# Patient Record
Sex: FEMALE | Race: WHITE | NOT HISPANIC OR LATINO | Employment: OTHER | URBAN - METROPOLITAN AREA
[De-identification: names, ages, dates, MRNs, and addresses within clinical notes are randomized per-mention and may not be internally consistent; named-entity substitution may affect disease eponyms.]

---

## 2022-06-22 ENCOUNTER — OFFICE VISIT (OUTPATIENT)
Dept: OPHTHALMOLOGY | Facility: CLINIC | Age: 52
End: 2022-06-22
Payer: COMMERCIAL

## 2022-06-22 DIAGNOSIS — E07.9 THYROID EYE DISEASE: Primary | ICD-10-CM

## 2022-06-22 DIAGNOSIS — E05.00 PROPTOSIS DUE TO THYROID DISORDER: ICD-10-CM

## 2022-06-22 DIAGNOSIS — H57.89 THYROID EYE DISEASE: Primary | ICD-10-CM

## 2022-06-22 DIAGNOSIS — H02.539 EYELID RETRACTION OR LAG: ICD-10-CM

## 2022-06-22 PROCEDURE — 99204 OFFICE O/P NEW MOD 45 MIN: CPT | Performed by: OPHTHALMOLOGY

## 2022-06-22 RX ORDER — METHIMAZOLE 5 MG/1
TABLET ORAL
COMMUNITY
Start: 2021-12-02

## 2022-06-22 RX ORDER — MULTIVITAMIN
TABLET ORAL
COMMUNITY

## 2022-06-22 ASSESSMENT — REFRACTION_WEARINGRX
OD_AXIS: 157
OS_SPHERE: -1.00
OS_AXIS: 005
SPECS_TYPE: PAL
OS_CYLINDER: +1.25
OD_CYLINDER: +1.50
OD_SPHERE: -1.25

## 2022-06-22 ASSESSMENT — VISUAL ACUITY
OD_CC: 20/20
OS_PH_CC: 20/20
METHOD: SNELLEN - LINEAR
CORRECTION_TYPE: GLASSES
OS_CC: 20/25

## 2022-06-22 ASSESSMENT — MARGIN REFLEX DISTANCE
OD_MRD1: 4
OD_MRD2: 5
OS_MRD1: 7
OS_MRD2: 5

## 2022-06-22 ASSESSMENT — TONOMETRY
OD_IOP_MMHG: 13
IOP_METHOD: TONOPEN
OS_IOP_MMHG: 15

## 2022-06-22 ASSESSMENT — CUP TO DISC RATIO
OD_RATIO: 0.2
OS_RATIO: 0.2

## 2022-06-22 ASSESSMENT — CONF VISUAL FIELD
METHOD: COUNTING FINGERS
OD_NORMAL: 1
OS_NORMAL: 1

## 2022-06-22 NOTE — PROGRESS NOTES
"     Chief Complaint(s) and History of Present Illness(es)     Referral     Comments:   Dr. Nicholas Brown referred patient for evaluation of left eye.              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  Patient says left eye is more open than right, increases later in the day.  Started 2021.  Denies tearing, redness, dryness each eye today.  Says a little double vision while reading or laying wrong way when   watching TV.  BLAINE was last year in [place], [place], says normal eye exam.  History of : LASIK each eye ~, dry eyes  Eye meds: systane gel each eye at bedtime, Blink tears each eye   FIDELINA Aguila 2022 8:49 AM    Thyroid: 3/2021 went to her OB GYN - because she was feeling hot all of the time and labs were obtained.  Found to be hyperthyroid, and treated with methimazole. She is currently on 5 mg.      Eyes: 2021 she first noticed her left upper eyelid started to \"pull.\" She notes the right upper eyelid has started to drop.  She will very rarely get diplopia - only notices if she lays on her side and watches television, but has not occurred recently.     Labs:  2022:   TSI (<1.3)  TSH: 0.579  T3: 106  T4: 1.10  A1c : 5.9    Non smoker    History of laser in situ keratomileusis (LASIK)    FINN: 0    Note: On phentermine      Assessment & Plan     Stacey M Schoenke is a 51 year old female with the following diagnoses:   Encounter Diagnoses   Name Primary?     Thyroid eye disease Yes     Proptosis due to thyroid disorder      Eyelid retraction or lag      Six months of symptoms but FINN is zero. Mild to moderate Thyroid eye disease with lid retraction and proptosis left.   Discussed options including steroids, Tepezza, and observation.    After discussing options, she elected to proceed with observation.   Recommend selenium 100mcg twice a day. Discussed ANDREWS can worsen Thyroid eye disease, and would treat with steroids, or consider " thyroidectomy instead which can improve symptoms.    F/u 4 months. Also discussed would see her in Thyroid eye disease clinic if develops diplopia.       Patient disposition:   No follow-ups on file.        Attending Physician Attestation: Complete documentation of historical and exam elements from today's encounter can be found in the full encounter summary report (not reduplicated in this progress note). I personally obtained the chief complaint(s) and history of present illness. I confirmed and edited as necessary the review of systems, past medical/surgical history, family history, social history, and examination findings as documented by others; and I examined the patient myself. I personally reviewed the relevant tests, images, and reports as documented above. I formulated and edited as necessary the assessment and plan and discussed the findings and management plan with the patient.  -Brian Castelan MD    40 minutes spent reviewing records, labs, evaluating patient and counseling.

## 2022-06-22 NOTE — NURSING NOTE
Chief Complaints and History of Present Illnesses   Patient presents with     Referral       Dr. Nicholas Brown referred patient for evaluation of left eye.      Chief Complaint(s) and History of Present Illness(es)     Referral     Comments:   Dr. Nicholas Brown referred patient for evaluation of left eye.              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  Patient says left eye is more open than right, increases later in the day.  Started December 27, 2021.  Denies tearing, redness, dryness each eye today.  Says a little double vision while reading or laying wrong way when watching TV.  BLAINE was last year in [place], [place], says normal eye exam.  History of : LASIK each eye ~2007, dry eyes  Eye meds: systane gel each eye at bedtime, Blink tears each eye   FIDELINA Aguila 6/22/2022 8:49 AM

## 2022-10-15 ENCOUNTER — HEALTH MAINTENANCE LETTER (OUTPATIENT)
Age: 52
End: 2022-10-15

## 2022-11-02 ENCOUNTER — OFFICE VISIT (OUTPATIENT)
Dept: OPHTHALMOLOGY | Facility: CLINIC | Age: 52
End: 2022-11-02
Payer: COMMERCIAL

## 2022-11-02 DIAGNOSIS — E07.9 THYROID EYE DISEASE: Primary | ICD-10-CM

## 2022-11-02 DIAGNOSIS — H02.539 EYELID RETRACTION OR LAG: ICD-10-CM

## 2022-11-02 DIAGNOSIS — E05.00 PROPTOSIS DUE TO THYROID DISORDER: ICD-10-CM

## 2022-11-02 DIAGNOSIS — H57.89 THYROID EYE DISEASE: Primary | ICD-10-CM

## 2022-11-02 PROCEDURE — 99213 OFFICE O/P EST LOW 20 MIN: CPT | Performed by: OPHTHALMOLOGY

## 2022-11-02 RX ORDER — METFORMIN HYDROCHLORIDE 750 MG/1
TABLET, EXTENDED RELEASE ORAL
COMMUNITY
Start: 2022-07-18

## 2022-11-02 ASSESSMENT — VISUAL ACUITY
CORRECTION_TYPE: GLASSES
OS_CC: 20/40
OS_CC+: +2
OD_CC+: -2
OD_CC: 20/20
METHOD: SNELLEN - LINEAR

## 2022-11-02 ASSESSMENT — CONF VISUAL FIELD
METHOD: COUNTING FINGERS
OD_SUPERIOR_TEMPORAL_RESTRICTION: 0
OS_NORMAL: 1
OS_SUPERIOR_NASAL_RESTRICTION: 0
OS_SUPERIOR_TEMPORAL_RESTRICTION: 0
OS_INFERIOR_TEMPORAL_RESTRICTION: 0
OD_INFERIOR_TEMPORAL_RESTRICTION: 0
OD_INFERIOR_NASAL_RESTRICTION: 0
OD_SUPERIOR_NASAL_RESTRICTION: 0
OD_NORMAL: 1
OS_INFERIOR_NASAL_RESTRICTION: 0

## 2022-11-02 ASSESSMENT — TONOMETRY
IOP_METHOD: ICARE
OD_IOP_MMHG: 10
OS_IOP_MMHG: 10

## 2022-11-02 ASSESSMENT — MARGIN REFLEX DISTANCE
OS_MRD2: 5
OD_MRD1: 4
OS_MRD1: 7
OD_MRD2: 5

## 2022-11-02 ASSESSMENT — CUP TO DISC RATIO
OD_RATIO: 0.2
OS_RATIO: 0.2

## 2022-11-02 NOTE — PROGRESS NOTES
"     Chief Complaint(s) and History of Present Illness(es)     Follow Up            Laterality: left eye    Comments: Graves follow up          Comments    Patient returning for 4 month ZENAIDA follow up. Patient states that her left   eye has been watering, worse in the mornings. Has ringing in her ear. She   wears her sunglasses a lot to try to cover her eye. Has not been wearing   any eye make-up. Has her son's wedding in two weeks (Nov 19th) and is   concerned about her left eye looking more open in photos. Getting   \"frustrated with the whole thing and ready for it to be over\"    Not painful, but \"annoying\".  Uses Systane Ultra all the time.   Little bit of double vision, but clears up quickly.       Thyroid: 3/2021 went to OB gyn, feeling hot all of the time and labs were obtained. Found to have multinodular goiter. Down to 2.5 mg of methimazole daily. Considering thyroidectomy.  Endocrinologist: Dr. Marilou Moraes     Eyes: 12/26 started noticing left upper eyelid pulling. Occasional ache. Feels a bit worse with watering now on the left side. Occasional diplopia lasts very brief moments. Feels right eye is overall normal.     Prior TSI: 2 4/5/2022 (<1.3)   TSH was 1.58 10/22/2022  Assessment & Plan     Stacey M Schoenke is a 51 year old female with the following diagnoses:   Encounter Diagnoses   Name Primary?     Thyroid eye disease Yes     Proptosis due to thyroid disorder      Eyelid retraction or lag      Overall exam is stable. It has been almost one year since her onset of ocular symptoms.   Will observe but she is likely entering the chronic phase of the disease. If no changes in measurements in 3-4 months consider proceeding with left orbital decompression, then likely eyelid retraction repair and finally possibly bilateral lower fat decompression.    She is considering thyroidectomy.         Attending Physician Attestation: Complete documentation of historical and exam elements from today's encounter can be " found in the full encounter summary report (not reduplicated in this progress note). I personally obtained the chief complaint(s) and history of present illness. I confirmed and edited as necessary the review of systems, past medical/surgical history, family history, social history, and examination findings as documented by others; and I examined the patient myself. I personally reviewed the relevant tests, images, and reports as documented above. I formulated and edited as necessary the assessment and plan and discussed the findings and management plan with the patient.  -Brian Castelan MD

## 2022-11-02 NOTE — NURSING NOTE
"Chief Complaints and History of Present Illnesses   Patient presents with     Follow Up     Graves follow up       Chief Complaint(s) and History of Present Illness(es)     Follow Up            Laterality: left eye    Comments: Graves follow up          Comments    Patient returning for 4 month ZENAIDA follow up. Patient states that her left eye has been watering, worse in the mornings. Has ringing in her ear. She wears her sunglasses a lot to try to cover her eye. Has not been wearing any eye make-up. Has her son's wedding in two weeks (Nov 19th) and is concerned about her left eye looking more open in photos. Getting \"frustrated with the whole thing and ready for it to be over\"    Not painful, but \"annoying\".  Uses Systane Ultra all the time.   Little bit of double vision, but clears up quickly.                    Santana Pearce, Ophthalmic Assistant   "

## 2023-02-15 ENCOUNTER — OFFICE VISIT (OUTPATIENT)
Dept: OPHTHALMOLOGY | Facility: CLINIC | Age: 53
End: 2023-02-15
Payer: COMMERCIAL

## 2023-02-15 ENCOUNTER — TELEPHONE (OUTPATIENT)
Dept: NURSING | Facility: CLINIC | Age: 53
End: 2023-02-15

## 2023-02-15 DIAGNOSIS — H57.89 THYROID EYE DISEASE: Primary | ICD-10-CM

## 2023-02-15 DIAGNOSIS — E05.00 PROPTOSIS DUE TO THYROID DISORDER: ICD-10-CM

## 2023-02-15 DIAGNOSIS — E07.9 THYROID EYE DISEASE: Primary | ICD-10-CM

## 2023-02-15 DIAGNOSIS — H02.539 EYELID RETRACTION OR LAG: ICD-10-CM

## 2023-02-15 PROCEDURE — 99214 OFFICE O/P EST MOD 30 MIN: CPT | Performed by: OPHTHALMOLOGY

## 2023-02-15 ASSESSMENT — CONF VISUAL FIELD
OS_NORMAL: 1
OD_SUPERIOR_TEMPORAL_RESTRICTION: 0
OD_NORMAL: 1
OD_INFERIOR_NASAL_RESTRICTION: 0
OS_SUPERIOR_NASAL_RESTRICTION: 0
OD_INFERIOR_TEMPORAL_RESTRICTION: 0
OS_SUPERIOR_TEMPORAL_RESTRICTION: 0
OS_INFERIOR_TEMPORAL_RESTRICTION: 0
OS_INFERIOR_NASAL_RESTRICTION: 0
OD_SUPERIOR_NASAL_RESTRICTION: 0

## 2023-02-15 ASSESSMENT — VISUAL ACUITY
OS_CC: 20/50
METHOD: SNELLEN - LINEAR
CORRECTION_TYPE: GLASSES
OD_CC: 20/20
OS_CC+: +2

## 2023-02-15 ASSESSMENT — REFRACTION_WEARINGRX
OS_AXIS: 005
SPECS_TYPE: PAL
OS_SPHERE: -1.00
OS_CYLINDER: +1.25
OD_AXIS: 157
OD_CYLINDER: +1.50
OD_SPHERE: -1.25

## 2023-02-15 ASSESSMENT — TONOMETRY
IOP_METHOD: ICARE
OD_IOP_MMHG: 11
OS_IOP_MMHG: 10

## 2023-02-15 ASSESSMENT — CUP TO DISC RATIO
OS_RATIO: 0.2
OD_RATIO: 0.2

## 2023-02-15 NOTE — PROGRESS NOTES
"     Chief Complaint(s) and History of Present Illness(es)     Follow Up            Comments: Thyroid eye disease           Comments    Pt states the left eye is about the same as last visit   C/o tearing left eye in the mornings  States \"Not painful, but \"annoying\".  No AM crusting or redness    Gertrude Duong COT 10:18 AM February 15, 2023     Thyroid: 3/2021 went to OB gyn, feeling hot all of the time and labs were obtained. Found to have multinodular goiter. Down to 2.5 mg of methimazole daily. Considering thyroidectomy.  Endocrinologist: Dr. Marilou Moraes      Eyes: 12/26/21 started noticing left upper eyelid pulling. Occasional ache. Feels a bit worse with watering now on the left side. Occasional diplopia lasts very brief moments. Feels right eye is overall normal.     A1c 6.1    Assessment & Plan     Stacey M Schoenke is a 52 year old female with the following diagnoses:   Encounter Diagnoses   Name Primary?     Thyroid eye disease Yes     Proptosis due to thyroid disorder      Eyelid retraction or lag      She is in the stable phase of her thryoid eye disease, with stable measurements since June of last year.    She has significant dry eye.    Discussed options including bone and fat decompression on the left, lid retraction and lower lid fat decompression.    At this point will hold off on bone and fat decompression on the left.    Start with left upper eyelid retraction repair - mullerectomy [id number]    Afterwards can consider lower fat decompression.   Patient disposition:   No follow-ups on file.        Attending Physician Attestation: Complete documentation of historical and exam elements from today's encounter can be found in the full encounter summary report (not reduplicated in this progress note). I personally obtained the chief complaint(s) and history of present illness. I confirmed and edited as necessary the review of systems, past medical/surgical history, family history, social history, and " examination findings as documented by others; and I examined the patient myself. I personally reviewed the relevant tests, images, and reports as documented above. I formulated and edited as necessary the assessment and plan and discussed the findings and management plan with the patient.  -Brian Castelan MD    Today with Stacey M Schoenke  and her  Adriano, I reviewed the indications, risks, benefits, and alternatives of the proposed surgical procedure including, but not limited to, failure obtain the desired result  and need for additional surgery, bleeding, infection, loss of vision, loss of the eye, and the remote possibility of permanent damage to any organ system or death with the use of anesthesia.  I provided multiple opportunities for the questions, answered all questions to the best of my ability, and confirmed that my answers and my discussion were understood.   Brian Castelan MD

## 2023-02-15 NOTE — TELEPHONE ENCOUNTER
Scheduled surgery for 4/17 in [place].    H&P with PCP.    Post-op scheduled for 5/10.    Packet given in clinic.    No further questions/concerns at this time.

## 2023-02-15 NOTE — NURSING NOTE
"Chief Complaints and History of Present Illnesses   Patient presents with     Follow Up     Thyroid eye disease      Chief Complaint(s) and History of Present Illness(es)     Follow Up            Comments: Thyroid eye disease           Comments    Pt states the left eye is about the same as last visit   C/o tearing left eye in the mornings  States \"Not painful, but \"annoying\".  No AM crusting or redness    Gertrude Duong COT 10:18 AM February 15, 2023                      "

## 2023-04-04 ENCOUNTER — TELEPHONE (OUTPATIENT)
Dept: OPHTHALMOLOGY | Facility: CLINIC | Age: 53
End: 2023-04-04
Payer: COMMERCIAL

## 2023-04-07 ENCOUNTER — TELEPHONE (OUTPATIENT)
Dept: OPHTHALMOLOGY | Facility: CLINIC | Age: 53
End: 2023-04-07
Payer: COMMERCIAL

## 2023-04-07 NOTE — TELEPHONE ENCOUNTER
Writer reached out to the insurance and spoke with Юлия. Юлия states a peer to peer is the only option for this case. Call reference # [id number].    I can confirm that the office notes dated 2/15/2023, 11/2/2022 and 6/22/2022 were all sent in for a reconsideration on 4/4/2023 as our notes indicate this. The case was still denied. Please advise

## 2023-04-07 NOTE — TELEPHONE ENCOUNTER
Records were sent again to the fax # [phone] requesting the records be placed to the case to reference for the upcoming peer to peer scheduled 4/12/23.

## 2023-04-07 NOTE — TELEPHONE ENCOUNTER
Good Morning,    This is a peer to peer request for Left upper eyelid retraction repair procedure. The insurance is requesting this to better understand the reason why the test is being ordered. This peer to peer needs to be completed on or before 4/12/2023.     Please call the insurance company and reference the following information:    Payor phone number: [phone]    Case number: [id number]    Patient ID: [id number]    Reason why it was denied: not medically necessary           Requesting response of outcome back to FC's on approval/denial with the following information:   o auth#  o date range  o who they spoke to     If you have any further questions please feel free to reach out to me. Thank you for your attention on this.       Willa Funk    Financial Counselor  [address] [address] [place] [id number]  Phone- [phone]  Fax- [phone]

## 2023-04-14 ENCOUNTER — ANESTHESIA EVENT (OUTPATIENT)
Dept: SURGERY | Facility: AMBULATORY SURGERY CENTER | Age: 53
End: 2023-04-14
Payer: COMMERCIAL

## 2023-04-17 ENCOUNTER — ANESTHESIA (OUTPATIENT)
Dept: SURGERY | Facility: AMBULATORY SURGERY CENTER | Age: 53
End: 2023-04-17
Payer: COMMERCIAL

## 2023-04-17 ENCOUNTER — HOSPITAL ENCOUNTER (OUTPATIENT)
Facility: AMBULATORY SURGERY CENTER | Age: 53
Discharge: HOME | End: 2023-04-17
Attending: OPHTHALMOLOGY | Admitting: OPHTHALMOLOGY
Payer: COMMERCIAL

## 2023-04-17 VITALS
BODY MASS INDEX: 35.17 KG/M2 | SYSTOLIC BLOOD PRESSURE: 122 MMHG | TEMPERATURE: 98 F | RESPIRATION RATE: 16 BRPM | HEIGHT: 64 IN | OXYGEN SATURATION: 96 % | WEIGHT: 206 LBS | DIASTOLIC BLOOD PRESSURE: 69 MMHG

## 2023-04-17 DIAGNOSIS — E07.9 THYROID EYE DISEASE: ICD-10-CM

## 2023-04-17 DIAGNOSIS — H02.539 EYELID RETRACTION OR LAG: ICD-10-CM

## 2023-04-17 DIAGNOSIS — H57.89 THYROID EYE DISEASE: ICD-10-CM

## 2023-04-17 DIAGNOSIS — E05.00 PROPTOSIS DUE TO THYROID DISORDER: ICD-10-CM

## 2023-04-17 PROCEDURE — G8918 PT W/O PREOP ORDER IV AB PRO: HCPCS

## 2023-04-17 PROCEDURE — 67911 REVISE EYELID DEFECT: CPT | Mod: E1

## 2023-04-17 PROCEDURE — 67911 REVISE EYELID DEFECT: CPT | Mod: E1 | Performed by: OPHTHALMOLOGY

## 2023-04-17 PROCEDURE — G8907 PT DOC NO EVENTS ON DISCHARG: HCPCS

## 2023-04-17 RX ORDER — TETRACAINE HYDROCHLORIDE 5 MG/ML
SOLUTION OPHTHALMIC PRN
Status: DISCONTINUED | OUTPATIENT
Start: 2023-04-17 | End: 2023-04-17 | Stop reason: HOSPADM

## 2023-04-17 RX ORDER — HYDROXYZINE HYDROCHLORIDE 25 MG/1
25 TABLET, FILM COATED ORAL EVERY 6 HOURS PRN
Status: DISCONTINUED | OUTPATIENT
Start: 2023-04-17 | End: 2023-04-18 | Stop reason: HOSPADM

## 2023-04-17 RX ORDER — HALOPERIDOL 5 MG/ML
1 INJECTION INTRAMUSCULAR
Status: DISCONTINUED | OUTPATIENT
Start: 2023-04-17 | End: 2023-04-18 | Stop reason: HOSPADM

## 2023-04-17 RX ORDER — OXYCODONE HYDROCHLORIDE 5 MG/1
5 TABLET ORAL
Status: DISCONTINUED | OUTPATIENT
Start: 2023-04-17 | End: 2023-04-18 | Stop reason: HOSPADM

## 2023-04-17 RX ORDER — LABETALOL HYDROCHLORIDE 5 MG/ML
10 INJECTION, SOLUTION INTRAVENOUS
Status: DISCONTINUED | OUTPATIENT
Start: 2023-04-17 | End: 2023-04-18 | Stop reason: HOSPADM

## 2023-04-17 RX ORDER — LIDOCAINE 40 MG/G
CREAM TOPICAL
Status: DISCONTINUED | OUTPATIENT
Start: 2023-04-17 | End: 2023-04-18 | Stop reason: HOSPADM

## 2023-04-17 RX ORDER — FENTANYL CITRATE 50 UG/ML
25 INJECTION, SOLUTION INTRAMUSCULAR; INTRAVENOUS
Status: DISCONTINUED | OUTPATIENT
Start: 2023-04-17 | End: 2023-04-18 | Stop reason: HOSPADM

## 2023-04-17 RX ORDER — DEXAMETHASONE SODIUM PHOSPHATE 4 MG/ML
4 INJECTION, SOLUTION INTRA-ARTICULAR; INTRALESIONAL; INTRAMUSCULAR; INTRAVENOUS; SOFT TISSUE
Status: DISCONTINUED | OUTPATIENT
Start: 2023-04-17 | End: 2023-04-18 | Stop reason: HOSPADM

## 2023-04-17 RX ORDER — KETOROLAC TROMETHAMINE 30 MG/ML
15 INJECTION, SOLUTION INTRAMUSCULAR; INTRAVENOUS
Status: DISCONTINUED | OUTPATIENT
Start: 2023-04-17 | End: 2023-04-18 | Stop reason: HOSPADM

## 2023-04-17 RX ORDER — FENTANYL CITRATE 50 UG/ML
25 INJECTION, SOLUTION INTRAMUSCULAR; INTRAVENOUS EVERY 5 MIN PRN
Status: DISCONTINUED | OUTPATIENT
Start: 2023-04-17 | End: 2023-04-18 | Stop reason: HOSPADM

## 2023-04-17 RX ORDER — ACETAMINOPHEN 325 MG/1
975 TABLET ORAL ONCE
Status: DISCONTINUED | OUTPATIENT
Start: 2023-04-17 | End: 2023-04-18 | Stop reason: HOSPADM

## 2023-04-17 RX ORDER — ONDANSETRON 4 MG/1
4 TABLET, ORALLY DISINTEGRATING ORAL EVERY 30 MIN PRN
Status: DISCONTINUED | OUTPATIENT
Start: 2023-04-17 | End: 2023-04-18 | Stop reason: HOSPADM

## 2023-04-17 RX ORDER — SODIUM CHLORIDE, SODIUM LACTATE, POTASSIUM CHLORIDE, CALCIUM CHLORIDE 600; 310; 30; 20 MG/100ML; MG/100ML; MG/100ML; MG/100ML
INJECTION, SOLUTION INTRAVENOUS CONTINUOUS
Status: DISCONTINUED | OUTPATIENT
Start: 2023-04-17 | End: 2023-04-18 | Stop reason: HOSPADM

## 2023-04-17 RX ORDER — OXYCODONE HYDROCHLORIDE 5 MG/1
10 TABLET ORAL
Status: DISCONTINUED | OUTPATIENT
Start: 2023-04-17 | End: 2023-04-18 | Stop reason: HOSPADM

## 2023-04-17 RX ORDER — ACETAMINOPHEN 325 MG/1
975 TABLET ORAL ONCE
Status: COMPLETED | OUTPATIENT
Start: 2023-04-17 | End: 2023-04-17

## 2023-04-17 RX ORDER — NEOMYCIN SULFATE, POLYMYXIN B SULFATE AND DEXAMETHASONE 3.5; 10000; 1 MG/ML; [USP'U]/ML; MG/ML
SUSPENSION/ DROPS OPHTHALMIC
Qty: 5 ML | Refills: 0 | Status: SHIPPED | OUTPATIENT
Start: 2023-04-17

## 2023-04-17 RX ORDER — LIDOCAINE HYDROCHLORIDE 20 MG/ML
INJECTION, SOLUTION INFILTRATION; PERINEURAL PRN
Status: DISCONTINUED | OUTPATIENT
Start: 2023-04-17 | End: 2023-04-17

## 2023-04-17 RX ORDER — DIMENHYDRINATE 50 MG/ML
25 INJECTION, SOLUTION INTRAMUSCULAR; INTRAVENOUS
Status: DISCONTINUED | OUTPATIENT
Start: 2023-04-17 | End: 2023-04-18 | Stop reason: HOSPADM

## 2023-04-17 RX ORDER — ALBUTEROL SULFATE 0.83 MG/ML
2.5 SOLUTION RESPIRATORY (INHALATION) EVERY 4 HOURS PRN
Status: DISCONTINUED | OUTPATIENT
Start: 2023-04-17 | End: 2023-04-18 | Stop reason: HOSPADM

## 2023-04-17 RX ORDER — FENTANYL CITRATE 50 UG/ML
50 INJECTION, SOLUTION INTRAMUSCULAR; INTRAVENOUS EVERY 5 MIN PRN
Status: DISCONTINUED | OUTPATIENT
Start: 2023-04-17 | End: 2023-04-18 | Stop reason: HOSPADM

## 2023-04-17 RX ORDER — ONDANSETRON 2 MG/ML
4 INJECTION INTRAMUSCULAR; INTRAVENOUS EVERY 30 MIN PRN
Status: DISCONTINUED | OUTPATIENT
Start: 2023-04-17 | End: 2023-04-18 | Stop reason: HOSPADM

## 2023-04-17 RX ORDER — HYDRALAZINE HYDROCHLORIDE 20 MG/ML
2.5-5 INJECTION INTRAMUSCULAR; INTRAVENOUS EVERY 10 MIN PRN
Status: DISCONTINUED | OUTPATIENT
Start: 2023-04-17 | End: 2023-04-18 | Stop reason: HOSPADM

## 2023-04-17 RX ORDER — LORAZEPAM 2 MG/ML
.5-1 INJECTION INTRAMUSCULAR
Status: DISCONTINUED | OUTPATIENT
Start: 2023-04-17 | End: 2023-04-18 | Stop reason: HOSPADM

## 2023-04-17 RX ORDER — PROPOFOL 10 MG/ML
INJECTION, EMULSION INTRAVENOUS PRN
Status: DISCONTINUED | OUTPATIENT
Start: 2023-04-17 | End: 2023-04-17

## 2023-04-17 RX ORDER — ERYTHROMYCIN 5 MG/G
OINTMENT OPHTHALMIC PRN
Status: DISCONTINUED | OUTPATIENT
Start: 2023-04-17 | End: 2023-04-17 | Stop reason: HOSPADM

## 2023-04-17 RX ORDER — MEPERIDINE HYDROCHLORIDE 25 MG/ML
12.5 INJECTION INTRAMUSCULAR; INTRAVENOUS; SUBCUTANEOUS EVERY 5 MIN PRN
Status: DISCONTINUED | OUTPATIENT
Start: 2023-04-17 | End: 2023-04-18 | Stop reason: HOSPADM

## 2023-04-17 RX ADMIN — ACETAMINOPHEN 975 MG: 325 TABLET ORAL at 10:23

## 2023-04-17 RX ADMIN — LIDOCAINE HYDROCHLORIDE 60 MG: 20 INJECTION, SOLUTION INFILTRATION; PERINEURAL at 11:01

## 2023-04-17 RX ADMIN — PROPOFOL 70 MG: 10 INJECTION, EMULSION INTRAVENOUS at 11:02

## 2023-04-17 RX ADMIN — SODIUM CHLORIDE, SODIUM LACTATE, POTASSIUM CHLORIDE, CALCIUM CHLORIDE: 600; 310; 30; 20 INJECTION, SOLUTION INTRAVENOUS at 10:59

## 2023-04-17 NOTE — DISCHARGE INSTRUCTIONS
Post-operative Instructions  Ophthalmic Plastic and Reconstructive Surgery    Brian Castelan M.D.     All instructions apply to the operated eye(s) or eyelid(s).    Wound care and personal care  ? Apply ice compresses 15 minutes of every hour while awake for 2 days. If you are sleeping, you don't need to wake up to ice. As long as there is no further bleeding, after two days, switch to warm water compresses for five minutes, four times a day until seen by your physician.   ? You may shower or wash your hair the day after surgery. Do not go swimming for at least 2 weeks to prevent contamination of your wounds.  ? You may go for walks and light activity is ok, but no heavy (over 15 pounds) lifting, bending or excessive straining for one week.   ? Do not apply make-up to the eyes or eyelids for 2 weeks after surgery.  ? Expect some swelling, bruising, black eye (even into the lower eyelids and cheeks). Also expect serum caking, crusting and discharge from the eye and/or incisions. A small amount of surface bleeding, and depending on the type of surgery, bleeding from the inside of the eyelid, is normal for the first 48 hours.  ? Avoid straining, bending at the waist, or lifting more than 15 pounds for 1 week. Sleeping with your head elevated, such as in a recliner, for the first several days can help swelling resolve more quickly.   ? Do continue to ambulate (walk) as you normally would - being sedentary after surgery can cause blood clots.   ? Your eye(s) and eyelid(s) may be painful and tender. This is normal after surgery.      Contact information and follow-up  ? Return to the Eye Clinic for a follow-up appointment with your physician as scheduled. If no appointment has been scheduled:     -  [facility] eye clinic: [phone] for an appointment with Dr. Castelan within 2 to 3 weeks from your date of surgery.     ? For severe pain, bleeding, or loss of vision, call the [facility] at [phone] or [facility] at [phone].     After hours or on weekends and holidays, call [phone] and ask to speak with the ophthalmologist on call.    An on call person can be reached after hours for concerns. The on call doctor should not call in medication refill requests after hours or on weekends, so please plan accordingly. An effort has been made to provide adequate pain medications following every surgery, and refills will not be provided in most instances.     Medications  ? Restart all regular home medications and eye drops. If you take Plavix or Aspirin on a regular basis, wait for 72 hours after your surgery before restarting these in order to decrease the risk of bleeding complications.  ? Avoid aspirin and aspirin-like medications (Motrin, Aleve, Ibuprofen, [place] etc) for 72 hours to reduce the risk of bleeding. You may take Tylenol (acetaminophen) for pain.  ? In addition to your home medications, take the following post-operative medications as prescribed by your physician.    ? Instill prescribed eye drops 3 times a day for 10 days.   ? If you have ocular irritation, you can use over the counter artificial tears such as Refresh, Systane, or Blink. Do not use Visine, Clear Eyes, or any other drop that gets the red out.       While you were at the hospital today you received 975 mg Tylenol at 10:30 am. Maximum daily dosage is 4000 mg.    [facility]   Adult Discharge Orders & Instructions     For 24 hours after surgery    Get plenty of rest.  A responsible adult must stay with you for at least 24 hours after you leave the hospital.   Do not drive or use heavy equipment.  If you have weakness or tingling, don't drive or use heavy equipment until this feeling goes away.  Do not drink alcohol.  Avoid strenuous or risky activities.  Ask for help when climbing stairs.   You may feel lightheaded.  IF so, sit for a few minutes before standing.  Have  someone help you get up.   If you have nausea (feel sick to your stomach): Drink only clear liquids such as apple juice, ginger ale, broth or 7-Up.  Rest may also help.  Be sure to drink enough fluids.  Move to a regular diet as you feel able.  You may have a slight fever. Call the doctor if your fever is over 100 F (37.7 C) (taken under the tongue) or lasts longer than 24 hours.  You may have a dry mouth, a sore throat, muscle aches or trouble sleeping.  These should go away after 24 hours.  Do not make important or legal decisions.     Call your doctor for any of the followin.  Signs of infection (fever, growing tenderness at the surgery site, a large amount of drainage or bleeding, severe pain, foul-smelling drainage, redness, swelling).    2. It has been over 8 to 10 hours since surgery and you are still not able to urinate (pass water).    3.  Headache for over 24 hours.    4.  Numbness, tingling or weakness the day after surgery (if you had spinal anesthesia).                  5. Signs of Covid-19 infection (temperature over 100 degrees, shortness of breath, cough, loss of taste/smell, generalized body aches, persistent headache,                  chills, sore throat, nausea/vomiting/diarrhea).

## 2023-04-17 NOTE — OP NOTE
PREOPERATIVE DIAGNOSIS: Left upper eyelid retraction. Thyroid eye disease.   POSTOPERATIVE DIAGNOSIS: Left upper eyelid retraction. Thyroid eye disease.   PROCEDURE PERFORMED:Left  upper eyelid retraction repair.   SURGEON: Brian Castelan MD   ANESTHESIA: Monitored with local infiltration of a 50/50 mixture of 2% lidocaine with epinephrine and 0.5% Marcaine.   COMPLICATIONS: None.   ESTIMATED BLOOD LOSS: Less than 5 mL.   HISTORY: Stacey M Schoenke  presented with lid retraction of the left upper lid, leading to exposure keratitis. After the risks, benefits and alternatives to the proposed procedure were explained, informed consent was obtained.   DESCRIPTION OF PROCEDURE: Stacey M Schoenke  was brought to the operating room and placed supine on the operating table. IV sedation was given. The left upper eyelid was infiltrated with local anesthetic. The area  was prepped and draped in the typical sterile ophthalmic fashion. Attention was directed to the left  side. 4-0 silk suture was placed through the eyelid margin and the eyelid everted over a Desmarres retractor. The conjunctiva was hydrodissected from the underlying Jurado's muscle with the local anesthetic mixture. A #15 blade was used to incise the conjunctiva at the superior tarsal border. Using a straight iris scissors, the conjunctiva was gently dissected from the underlying Jurado's muscle. Conjunctiva was then released from the superior tarsal border. The Jurado's muscle was then hydrodissected from the underlying levator aponeurosis. The Buck scissor was used to dissect the Jurado's muscle from the superior tarsal border using the high temperature cautery. Jurado's muscle was resected. Hemostasis was obtained, the lid reverted to its normal position. The silk suture was removed. Antibiotic ointment applied to the eye. The patient tolerated the procedure well and  left the operating room in stable condition.   Brian Castelan MD

## 2023-04-17 NOTE — ANESTHESIA POSTPROCEDURE EVALUATION
Patient: Stacey M Schoenke    Procedure: Procedure(s):  Left upper eyelid retraction repair       Anesthesia Type:  MAC    Note:  Disposition: Outpatient   Postop Pain Control: Uneventful            Sign Out: Well controlled pain   PONV: No   Neuro/Psych: Uneventful            Sign Out: Acceptable/Baseline neuro status   Airway/Respiratory: Uneventful            Sign Out: Acceptable/Baseline resp. status   CV/Hemodynamics: Uneventful            Sign Out: Acceptable CV status   Other NRE: NONE   DID A NON-ROUTINE EVENT OCCUR? No           Last vitals:  Vitals Value Taken Time   /69 04/17/23 1145   Temp 36.7  C (98  F) 04/17/23 1128   Pulse     Resp 16 04/17/23 1145   SpO2 96 % 04/17/23 1145       Electronically Signed By: Lee Vogel MD  April 17, 2023  3:06 PM

## 2023-04-17 NOTE — ANESTHESIA PREPROCEDURE EVALUATION
Anesthesia Pre-Procedure Evaluation    Patient: Stacey M Schoenke   MRN: [id number] : 1970        Procedure : Procedure(s):  Left upper eyelid retraction repair          Past Medical History:   Diagnosis Date     Thyroid disease       Past Surgical History:   Procedure Laterality Date     GYN SURGERY       LASIK        No Known Allergies   Social History     Tobacco Use     Smoking status: Never     Smokeless tobacco: Never   Vaping Use     Vaping status: Not on file   Substance Use Topics     Alcohol use: Yes     Comment: Weekends - few drinks      Wt Readings from Last 1 Encounters:   23 93.4 kg (206 lb)        Anesthesia Evaluation            ROS/MED HX  ENT/Pulmonary:       Neurologic:       Cardiovascular:       METS/Exercise Tolerance:     Hematologic:       Musculoskeletal:       GI/Hepatic:       Renal/Genitourinary:       Endo:     (+) thyroid problem,     Psychiatric/Substance Use:       Infectious Disease:       Malignancy:       Other:            Physical Exam    Airway  airway exam normal      Mallampati: II   TM distance: > 3 FB   Neck ROM: full   Mouth opening: > 3 cm    Respiratory Devices and Support         Dental       (+) Completely normal teeth      Cardiovascular          Rhythm and rate: regular and normal     Pulmonary   pulmonary exam normal        breath sounds clear to auscultation           OUTSIDE LABS:  CBC: No results found for: WBC, HGB, HCT, PLT  BMP: No results found for: NA, POTASSIUM, CHLORIDE, CO2, BUN, CR, GLC  COAGS: No results found for: PTT, INR, FIBR  POC: No results found for: BGM, HCG, HCGS  HEPATIC: No results found for: ALBUMIN, PROTTOTAL, ALT, AST, GGT, ALKPHOS, BILITOTAL, BILIDIRECT, PRANAV  OTHER: No results found for: PH, LACT, A1C, DEEP, PHOS, MAG, LIPASE, AMYLASE, TSH, T4, T3, CRP, SED    Anesthesia Plan    ASA Status:  1   NPO Status:  NPO Appropriate    Anesthesia Type: MAC.     - Reason for MAC: immobility needed, straight local not clinically  adequate   Induction: Intravenous.   Maintenance: TIVA.        Consents    Anesthesia Plan(s) and associated risks, benefits, and realistic alternatives discussed. Questions answered and patient/representative(s) expressed understanding.    - Discussed:     - Discussed with:  Patient      - Extended Intubation/Ventilatory Support Discussed: No.      - Patient is DNR/DNI Status: No    Use of blood products discussed: No .     Postoperative Care    Pain management: IV analgesics, Oral pain medications, Multi-modal analgesia.   PONV prophylaxis: Ondansetron (or other 5HT-3), Background Propofol Infusion     Comments:                Lee Vogel MD

## 2023-04-17 NOTE — INTERVAL H&P NOTE
"I have reviewed the surgical (or preoperative) H&P that is linked to this encounter, and examined the patient. There are no significant changes    Clinical Conditions Present on Arrival:  Clinically Significant Risk Factors Present on Admission                  # Obesity: Estimated body mass index is 35.64 kg/m  as calculated from the following:    Height as of this encounter: 1.619 m (5' 3.75\").    Weight as of this encounter: 93.4 kg (206 lb).       "

## 2023-04-17 NOTE — ANESTHESIA CARE TRANSFER NOTE
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    Anesthesia Type:   MAC     Note:      Level of Consciousness: awake  Oxygen Supplementation: room air    Independent Airway: airway patency satisfactory and stable  Dentition: dentition unchanged  Vital Signs Stable: post-procedure vital signs reviewed and stable  Report to RN Given: handoff report given  Patient transferred to: PACU    Handoff Report: Identifed the Patient, Identified the Reponsible Provider, Reviewed the pertinent medical history, Discussed the surgical course, Reviewed Intra-OP anesthesia mangement and issues during anesthesia, Set expectations for post-procedure period and Allowed opportunity for questions and acknowledgement of understanding      Vitals:  Vitals Value Taken Time   BP     Temp     Pulse     Resp     SpO2         Electronically Signed By: MARY ALICE Tim CRNA  April 17, 2023  11:30 AM

## 2023-05-10 ENCOUNTER — OFFICE VISIT (OUTPATIENT)
Dept: OPHTHALMOLOGY | Facility: CLINIC | Age: 53
End: 2023-05-10
Payer: COMMERCIAL

## 2023-05-10 DIAGNOSIS — E07.9 THYROID EYE DISEASE: Primary | ICD-10-CM

## 2023-05-10 DIAGNOSIS — H57.89 THYROID EYE DISEASE: Primary | ICD-10-CM

## 2023-05-10 DIAGNOSIS — H02.539 EYELID RETRACTION OR LAG: ICD-10-CM

## 2023-05-10 PROCEDURE — 99024 POSTOP FOLLOW-UP VISIT: CPT | Performed by: OPHTHALMOLOGY

## 2023-05-10 ASSESSMENT — VISUAL ACUITY
OS_CC+: -2
METHOD: SNELLEN - LINEAR
OD_CC: 20/20
CORRECTION_TYPE: GLASSES
OS_CC: 20/25

## 2023-05-10 ASSESSMENT — TONOMETRY
OD_IOP_MMHG: 10
IOP_METHOD: ICARE
OS_IOP_MMHG: 12

## 2023-05-10 NOTE — PROGRESS NOTES
Chief Complaint(s) and History of Present Illness(es)     Post Op (Ophthalmology) Left Eye            Laterality: left eye          Comments    S/P Left  upper eyelid retraction repair, 04/17/2023.   Healing well, no concerns.     Assessment & Plan     Stacey M Schoenke is a 52 year old female with the following diagnoses:   Encounter Diagnoses   Name Primary?     Thyroid eye disease Yes     Eyelid retraction or lag      Chronic thyroid eye disease.     Doing very well post left upper eyelid retraction repair.   Excellent symmetry.  Had previously discussed lower fat decompression, at this time she would like to observe, but will return if she becomes more symptomatic.     Local eye doctor for eye exams.   Patient disposition:   No follow-ups on file.        Attending Physician Attestation: Complete documentation of historical and exam elements from today's encounter can be found in the full encounter summary report (not reduplicated in this progress note). I personally obtained the chief complaint(s) and history of present illness. I confirmed and edited as necessary the review of systems, past medical/surgical history, family history, social history, and examination findings as documented by others; and I examined the patient myself. I personally reviewed the relevant tests, images, and reports as documented above. I formulated and edited as necessary the assessment and plan and discussed the findings and management plan with the patient.  -Brian Castelan MD

## 2023-05-10 NOTE — NURSING NOTE
Chief Complaints and History of Present Illnesses   Patient presents with     Post Op (Ophthalmology) Left Eye       Chief Complaint(s) and History of Present Illness(es)     Post Op (Ophthalmology) Left Eye            Laterality: left eye          Comments    S/P Left  upper eyelid retraction repair, 04/17/2023.   Healing well, no concerns.                    Santana Pearce, Ophthalmic Assistant

## 2023-05-25 ENCOUNTER — LAB REQUISITION (OUTPATIENT)
Dept: LAB | Facility: CLINIC | Age: 53
End: 2023-05-25
Payer: COMMERCIAL

## 2023-05-25 DIAGNOSIS — Z12.4 ENCOUNTER FOR SCREENING FOR MALIGNANT NEOPLASM OF CERVIX: ICD-10-CM

## 2023-05-25 PROCEDURE — 87624 HPV HI-RISK TYP POOLED RSLT: CPT | Mod: ORL | Performed by: OBSTETRICS & GYNECOLOGY

## 2023-05-25 PROCEDURE — G0145 SCR C/V CYTO,THINLAYER,RESCR: HCPCS | Mod: ORL | Performed by: OBSTETRICS & GYNECOLOGY

## 2023-05-30 LAB
BKR LAB AP GYN ADEQUACY: NORMAL
BKR LAB AP GYN INTERPRETATION: NORMAL
BKR LAB AP HPV REFLEX: NORMAL
BKR LAB AP LMP: NORMAL
BKR LAB AP PREVIOUS ABNL DX: NORMAL
BKR LAB AP PREVIOUS ABNORMAL: NORMAL
PATH REPORT.COMMENTS IMP SPEC: NORMAL
PATH REPORT.COMMENTS IMP SPEC: NORMAL
PATH REPORT.RELEVANT HX SPEC: NORMAL

## 2023-06-01 LAB
HUMAN PAPILLOMA VIRUS 16 DNA: NEGATIVE
HUMAN PAPILLOMA VIRUS 18 DNA: NEGATIVE
HUMAN PAPILLOMA VIRUS FINAL DIAGNOSIS: NORMAL
HUMAN PAPILLOMA VIRUS OTHER HR: NEGATIVE

## 2023-06-11 ENCOUNTER — HEALTH MAINTENANCE LETTER (OUTPATIENT)
Age: 53
End: 2023-06-11

## 2023-08-20 ENCOUNTER — HEALTH MAINTENANCE LETTER (OUTPATIENT)
Age: 53
End: 2023-08-20

## 2024-08-04 ENCOUNTER — HEALTH MAINTENANCE LETTER (OUTPATIENT)
Age: 54
End: 2024-08-04

## 2024-08-28 ENCOUNTER — LAB REQUISITION (OUTPATIENT)
Dept: LAB | Facility: CLINIC | Age: 54
End: 2024-08-28
Payer: COMMERCIAL

## 2024-08-28 DIAGNOSIS — Z01.419 ENCOUNTER FOR GYNECOLOGICAL EXAMINATION (GENERAL) (ROUTINE) WITHOUT ABNORMAL FINDINGS: ICD-10-CM

## 2024-08-28 LAB
CHOLEST SERPL-MCNC: 214 MG/DL
FASTING STATUS PATIENT QL REPORTED: YES
HBA1C MFR BLD: 6.1 %
HDLC SERPL-MCNC: 66 MG/DL
LDLC SERPL CALC-MCNC: 126 MG/DL
NONHDLC SERPL-MCNC: 148 MG/DL
T4 FREE SERPL-MCNC: 1.3 NG/DL (ref 0.9–1.7)
TRIGL SERPL-MCNC: 111 MG/DL
TSH SERPL DL<=0.005 MIU/L-ACNC: 1.67 UIU/ML (ref 0.3–4.2)

## 2024-08-28 PROCEDURE — 84439 ASSAY OF FREE THYROXINE: CPT | Mod: ORL | Performed by: OBSTETRICS & GYNECOLOGY

## 2024-08-28 PROCEDURE — 80061 LIPID PANEL: CPT | Mod: ORL | Performed by: OBSTETRICS & GYNECOLOGY

## 2024-08-28 PROCEDURE — 84443 ASSAY THYROID STIM HORMONE: CPT | Mod: ORL | Performed by: OBSTETRICS & GYNECOLOGY

## 2024-08-28 PROCEDURE — 83036 HEMOGLOBIN GLYCOSYLATED A1C: CPT | Mod: ORL | Performed by: OBSTETRICS & GYNECOLOGY

## 2025-08-16 ENCOUNTER — HEALTH MAINTENANCE LETTER (OUTPATIENT)
Age: 55
End: 2025-08-16

## (undated) DEVICE — SOL WATER IRRIG 1000ML BOTTLE 07139-09

## (undated) DEVICE — PACK MINOR EYE

## (undated) DEVICE — GLOVE BIOGEL PI MICRO SZ 7.5 48575

## (undated) DEVICE — ESU EYE HIGH TEMP 65410-183

## (undated) DEVICE — NDL 30GA 0.5" 305106

## (undated) DEVICE — SYR 03ML LL W/O NDL

## (undated) DEVICE — PREP CHLORAPREP 26ML TINTED ORANGE  260815

## (undated) DEVICE — MARKER SKIN DOUBLE TIP W/FLEXI-RULER W/LABELS

## (undated) DEVICE — DECANTER VIAL 2006S

## (undated) RX ORDER — PROPOFOL 10 MG/ML
INJECTION, EMULSION INTRAVENOUS
Status: DISPENSED
Start: 2023-04-17

## (undated) RX ORDER — ACETAMINOPHEN 325 MG/1
TABLET ORAL
Status: DISPENSED
Start: 2023-04-17